# Patient Record
Sex: FEMALE | Race: WHITE | NOT HISPANIC OR LATINO
[De-identification: names, ages, dates, MRNs, and addresses within clinical notes are randomized per-mention and may not be internally consistent; named-entity substitution may affect disease eponyms.]

---

## 2020-07-07 ENCOUNTER — RX RENEWAL (OUTPATIENT)
Age: 35
End: 2020-07-07

## 2020-07-07 PROBLEM — Z00.00 ENCOUNTER FOR PREVENTIVE HEALTH EXAMINATION: Status: ACTIVE | Noted: 2020-07-07

## 2020-07-07 RX ORDER — IPRATROPIUM BROMIDE 21 UG/1
0.03 SPRAY NASAL
Qty: 1 | Refills: 5 | Status: ACTIVE | COMMUNITY
Start: 2020-07-07 | End: 1900-01-01

## 2021-07-08 ENCOUNTER — NON-APPOINTMENT (OUTPATIENT)
Age: 36
End: 2021-07-08

## 2021-07-09 ENCOUNTER — APPOINTMENT (OUTPATIENT)
Dept: OTOLARYNGOLOGY | Facility: CLINIC | Age: 36
End: 2021-07-09
Payer: COMMERCIAL

## 2021-07-09 ENCOUNTER — NON-APPOINTMENT (OUTPATIENT)
Age: 36
End: 2021-07-09

## 2021-07-09 PROCEDURE — 99204 OFFICE O/P NEW MOD 45 MIN: CPT

## 2021-07-09 PROCEDURE — 99072 ADDL SUPL MATRL&STAF TM PHE: CPT

## 2021-07-09 NOTE — HISTORY OF PRESENT ILLNESS
[de-identified] : Patient status post septoplasty, nasal valve repair, balloon sinuplasty with Dr. Vickers September 2018. Reports she has been breathing well since then. Noticed a "bump" on the nasal dorsum soon thereafter. She moved to [place], and it has not changed since then.  It was mildly tender, less so now. Not changing or growing, soft by her report.  History of chronic rhinitis, doing well with Atrovent

## 2021-07-09 NOTE — ASSESSMENT
[FreeTextEntry1] : Nasal lesions status post nasal surgery. We discussed the possibility of granuloma versus displace radix graft versus other soft tissue lesion. I do not think this is a primarily bony deformity. If Kenalog and 5-FU are helpful in dissolving this lesion, she will let me know. Otherwise she will followup [place]di time when she is visiting again. Consider surgical excision if no improvement via an endonasal approach, Either a office or in the operating room depending on her preference.  Discuss surgical options in detail, possible excision of soft tissue mass versus rasping if the lesion is firmer and bony.  Damage to the skin soft tissue envelope was also a possibility with surgical removal, which I cautioned her about.  Also discussed possible nasal filler to mask the area if we are unable to remove it surgically

## 2022-10-04 ENCOUNTER — APPOINTMENT (OUTPATIENT)
Dept: OTOLARYNGOLOGY | Facility: CLINIC | Age: 37
End: 2022-10-04

## 2022-10-04 VITALS — BODY MASS INDEX: 22.9 KG/M2 | TEMPERATURE: 97 F | WEIGHT: 160 LBS | HEIGHT: 70 IN

## 2022-10-04 DIAGNOSIS — J34.89 OTHER SPECIFIED DISORDERS OF NOSE AND NASAL SINUSES: ICD-10-CM

## 2022-10-04 DIAGNOSIS — Z78.9 OTHER SPECIFIED HEALTH STATUS: ICD-10-CM

## 2022-10-04 DIAGNOSIS — Z82.49 FAMILY HISTORY OF ISCHEMIC HEART DISEASE AND OTHER DISEASES OF THE CIRCULATORY SYSTEM: ICD-10-CM

## 2022-10-04 DIAGNOSIS — J31.0 CHRONIC RHINITIS: ICD-10-CM

## 2022-10-04 PROCEDURE — 99212 OFFICE O/P EST SF 10 MIN: CPT

## 2022-10-04 RX ORDER — NORETHINDRONE ACETATE/ETHINYL ESTRADIOL 1MG-20MCG
KIT ORAL
Refills: 0 | Status: ACTIVE | COMMUNITY

## 2022-10-06 NOTE — ASSESSMENT
[FreeTextEntry1] : She has a history of chronic rhinitis.  She had nasal and sinus surgery in the past.  From a sinus standpoint she has been doing well.  She is also breathing a lot better although she still has some nasal congestion and obstruction on the left side.  She has had an area of swelling/fullness on the nasal dorsum.  Dr. Degroot performed an injection last year for a possible granuloma/scar.  The patient feels that there is recurrent swelling in the area\par \par Plan\par -Findings and management options were discussed with the patient.\par -Continue Atrovent as needed\par -She may try nasal steroid spray.\par -I am going to speak with Dr. Degroot about management of the patient's concern.  This is something that would be best be managed by her.  I will speak with the patient after I discussed this with Dr. Degroot\par \par \par

## 2022-10-06 NOTE — HISTORY OF PRESENT ILLNESS
[de-identified] : ANGELIQUE SUMMERS is a 37 year old patient who underwent nasal and sinus surgery in 2018.  I performed balloon sinuplasty while Dr. Degroot perform septoplasty, inferior turbinate reduction, and nasal valve repair.  The patient has done well from a sinus standpoint.  She has not had any sinus infections.  She still has some nasal congestion and obstruction on the left side at night.  She uses Atrovent as needed.  It helps with the nasal drainage.  She has not tried a nasal steroid spray recently.  She saw Dr. Degroot last year for swelling over the nasal dorsum.  Dr. Degroot performed an injection.  The patient felt that it helped.  She feels that she is having a recurrent swelling in the area.  It is not painful.  She currently lives in [place].